# Patient Record
Sex: FEMALE | Race: WHITE | NOT HISPANIC OR LATINO | Employment: STUDENT | URBAN - METROPOLITAN AREA
[De-identification: names, ages, dates, MRNs, and addresses within clinical notes are randomized per-mention and may not be internally consistent; named-entity substitution may affect disease eponyms.]

---
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
## 2024-10-15 ENCOUNTER — ANCILLARY PROCEDURE (OUTPATIENT)
Dept: URGENT CARE | Age: 19
End: 2024-10-15
Payer: COMMERCIAL

## 2024-10-15 ENCOUNTER — OFFICE VISIT (OUTPATIENT)
Dept: URGENT CARE | Age: 19
End: 2024-10-15
Payer: COMMERCIAL

## 2024-10-15 VITALS
DIASTOLIC BLOOD PRESSURE: 85 MMHG | SYSTOLIC BLOOD PRESSURE: 139 MMHG | RESPIRATION RATE: 16 BRPM | TEMPERATURE: 98.2 F | WEIGHT: 210 LBS | HEART RATE: 109 BPM | OXYGEN SATURATION: 98 % | BODY MASS INDEX: 30.05 KG/M2

## 2024-10-15 DIAGNOSIS — M79.672 LEFT FOOT PAIN: ICD-10-CM

## 2024-10-15 DIAGNOSIS — S92.214A CLOSED NONDISPLACED FRACTURE OF CUBOID OF RIGHT FOOT, INITIAL ENCOUNTER: ICD-10-CM

## 2024-10-15 DIAGNOSIS — S99.921A INJURY OF RIGHT FOOT, INITIAL ENCOUNTER: Primary | ICD-10-CM

## 2024-10-15 PROCEDURE — 73630 X-RAY EXAM OF FOOT: CPT | Mod: LEFT SIDE | Performed by: REGISTERED NURSE

## 2024-10-15 ASSESSMENT — ENCOUNTER SYMPTOMS
LOSS OF MOTION: 0
INABILITY TO BEAR WEIGHT: 1
NUMBNESS: 0
LOSS OF SENSATION: 0
ARTHRALGIAS: 1
TINGLING: 0

## 2024-10-15 NOTE — PROGRESS NOTES
Subjective   Patient ID: Chely Goodwin is a 19 y.o. female. They present today with a chief complaint of Foot Injury (Playing volleyball yesterday night ).    History of Present Illness    History provided by:  Patient  Foot Injury   The incident occurred 12 to 24 hours ago. The incident occurred at the gym. The injury mechanism was a twisting injury. The pain is present in the right foot. The quality of the pain is described as aching. The pain is at a severity of 5/10. The pain is moderate. The pain has been Fluctuating since onset. Associated symptoms include an inability to bear weight. Pertinent negatives include no loss of motion, loss of sensation, numbness or tingling. She reports no foreign bodies present. The symptoms are aggravated by movement and weight bearing. She has tried elevation, ice, non-weight bearing and rest for the symptoms. The treatment provided moderate relief.       Past Medical History  Allergies as of 10/15/2024    (No Known Allergies)       (Not in a hospital admission)       No past medical history on file.    No past surgical history on file.     reports that she has never smoked. She has never used smokeless tobacco. She reports current alcohol use. She reports that she does not use drugs.    Review of Systems  Review of Systems   Musculoskeletal:  Positive for arthralgias and gait problem.        Pain and swelling to right foot   Neurological:  Negative for tingling and numbness.                                  Objective    Vitals:    10/15/24 1439   BP: 139/85   BP Location: Left arm   Patient Position: Sitting   BP Cuff Size: Adult   Pulse: 109   Resp: 16   Temp: 36.8 °C (98.2 °F)   TempSrc: Oral   SpO2: 98%   Weight: 95.3 kg (210 lb)     Patient's last menstrual period was 10/01/2024 (approximate).    Physical Exam  Vitals and nursing note reviewed.   Musculoskeletal:      Right foot: Normal range of motion and normal capillary refill. Swelling and tenderness present. No bony  tenderness or crepitus. Normal pulse.        Feet:          Procedures    Point of Care Test & Imaging Results from this visit  No results found for this visit on 10/15/24.   No results found.    Diagnostic study results (if any) were reviewed by Crystal L Severino, APRN-CNP.    Assessment/Plan   Allergies, medications, history, and pertinent labs/EKGs/Imaging reviewed by Crystal L Severino, APRN-CNP.     Medical Decision Making  19-year-old female presents with complaints of pain to her outer right foot.  Patient states she was playing volleyball last night when she twisted her foot and immediately felt pain.  She states she is having difficulty with weightbearing and ambulation.  She has been relying on crutches for assistance.  Right foot x-rays obtained; positive for an avulsion injury of the lateral aspect of the cuboid.  Patient is given postop shoe for support.  She is discharged to home with RICE instructions and is to follow-up with orthopedics, patient verbalizes understanding has no further questions    Orders and Diagnoses  Diagnoses and all orders for this visit:  Injury of right foot, initial encounter  Left foot pain  -     XR foot left 3+ views; Future      Medical Admin Record      Patient disposition: Home    Electronically signed by Crystal L Severino, APRN-CNP  2:50 PM